# Patient Record
Sex: FEMALE | Race: WHITE | NOT HISPANIC OR LATINO
[De-identification: names, ages, dates, MRNs, and addresses within clinical notes are randomized per-mention and may not be internally consistent; named-entity substitution may affect disease eponyms.]

---

## 2022-01-07 ENCOUNTER — APPOINTMENT (OUTPATIENT)
Dept: ENDOCRINOLOGY | Facility: CLINIC | Age: 37
End: 2022-01-07

## 2022-01-28 ENCOUNTER — APPOINTMENT (OUTPATIENT)
Dept: ENDOCRINOLOGY | Facility: CLINIC | Age: 37
End: 2022-01-28
Payer: COMMERCIAL

## 2022-01-28 ENCOUNTER — NON-APPOINTMENT (OUTPATIENT)
Age: 37
End: 2022-01-28

## 2022-01-28 VITALS
SYSTOLIC BLOOD PRESSURE: 113 MMHG | OXYGEN SATURATION: 99 % | HEIGHT: 67 IN | HEART RATE: 75 BPM | WEIGHT: 137 LBS | DIASTOLIC BLOOD PRESSURE: 73 MMHG | BODY MASS INDEX: 21.5 KG/M2

## 2022-01-28 DIAGNOSIS — R76.8 OTHER SPECIFIED ABNORMAL IMMUNOLOGICAL FINDINGS IN SERUM: ICD-10-CM

## 2022-01-28 DIAGNOSIS — Z82.49 FAMILY HISTORY OF ISCHEMIC HEART DISEASE AND OTHER DISEASES OF THE CIRCULATORY SYSTEM: ICD-10-CM

## 2022-01-28 DIAGNOSIS — Z80.9 FAMILY HISTORY OF MALIGNANT NEOPLASM, UNSPECIFIED: ICD-10-CM

## 2022-01-28 DIAGNOSIS — Z83.3 FAMILY HISTORY OF DIABETES MELLITUS: ICD-10-CM

## 2022-01-28 DIAGNOSIS — Z78.9 OTHER SPECIFIED HEALTH STATUS: ICD-10-CM

## 2022-01-28 DIAGNOSIS — R79.89 OTHER SPECIFIED ABNORMAL FINDINGS OF BLOOD CHEMISTRY: ICD-10-CM

## 2022-01-28 PROCEDURE — 99204 OFFICE O/P NEW MOD 45 MIN: CPT

## 2022-01-31 NOTE — PHYSICAL EXAM
[Alert] : alert [Well Nourished] : well nourished [No Acute Distress] : no acute distress [Well Developed] : well developed [Normal Sclera/Conjunctiva] : normal sclera/conjunctiva [EOMI] : extra ocular movement intact [No Proptosis] : no proptosis [No Lid Lag] : no lid lag [Normal Hearing] : hearing was normal [No LAD] : no lymphadenopathy [Supple] : the neck was supple [Thyroid Not Enlarged] : the thyroid was not enlarged [No Thyroid Nodules] : no palpable thyroid nodules [No Respiratory Distress] : no respiratory distress [No Accessory Muscle Use] : no accessory muscle use [Normal Rate and Effort] : normal respiratory rate and effort [Clear to Auscultation] : lungs were clear to auscultation bilaterally [Normal S1, S2] : normal S1 and S2 [No Murmurs] : no murmurs [Normal Rate] : heart rate was normal [Regular Rhythm] : with a regular rhythm [No Edema] : no peripheral edema [No Stigmata of Cushings Syndrome] : no stigmata of Cushings Syndrome [Normal Gait] : normal gait [No Clubbing, Cyanosis] : no clubbing  or cyanosis of the fingernails [No Involuntary Movements] : no involuntary movements were seen [No Rash] : no rash [Abdominal Striae] : no abdominal striae [Acanthosis Nigricans] : no acanthosis nigricans [Acne] : no acne [Hirsutism] : no hirsutism [Normal Reflexes] : deep tendon reflexes were 2+ and symmetric [No Tremors] : no tremors [Oriented x3] : oriented to person, place, and time

## 2022-01-31 NOTE — ASSESSMENT
[FreeTextEntry1] : 1. Patient referred for positive TPO Ab \par Reviewed previous bloodwork from outside in 2021, noted normal TSH, free t4, and free t3 slightly low. Noted borderline low free t3 is likely clinically insignificant\par Explained positive TPO AB is indicative of autoimmune thyoird/Hashimoto background\par Patient is clinically euthyroid today, not currently pregnant or looking to be pregnant in future\par Advised patient she does not require thyroid medication at this time and to routine monitor TFTs at PCP office\par \par Answered all questions today; patient verbalized understanding of the above\par RTC as needed

## 2022-01-31 NOTE — DATA REVIEWED
[FreeTextEntry1] : TPO Ab 173 (<35), total t3 64 (), vitamin D 31.6 ()\par TSH 1.25, free t4 1.1, t3 uptake 32.1% normal, free t3 normal 2.3\par LH 4.9, FSH 5.5 normal\par Testosteroen 14.1 free testo 1.73, SHBG 56

## 2022-01-31 NOTE — HISTORY OF PRESENT ILLNESS
[FreeTextEntry1] : YAYA VERONICA  is a 37 yo female  with past medical history of who presents to clinic today as referred by for management of abnormal thyroid blood tests.\par \par Patient states she was referred for abnormal blood tests and found to have thyroid antibody positive and was referred for further management. She denies any known h/o thyroid disease, never treated with thyroid medication. She denies family h/o thyroid disease or thyroid cancer. Denies childhood irradiation to head or neck regions.She also  denies heat or cold intolerance, dyspnea, dysphagia, dysphonia, changes in bowel habits including diarrhea or constipation or any recent change in weight.  She denies change in menstrual cycles (cycles are regular, last days, LMP is 1/23/22 ). She also denies anxiety, palpitations, tremors/shaking of extremities or insomnia.\par \par PMH: none\par PSH: none\par Family Hx: DM, HTN. No h/o thyroid disease or thyroid cancer\par Social Hx: denies ETOH, tobacco or illicit drug use. works as immigrant \par ALL: NKDA \par Home Meds: MVI, vitamin D3

## 2022-01-31 NOTE — REVIEW OF SYSTEMS
[Fatigue] : no fatigue [Decreased Appetite] : appetite not decreased [Recent Weight Gain (___ Lbs)] : no recent weight gain [Recent Weight Loss (___ Lbs)] : no recent weight loss [Visual Field Defect] : no visual field defect [Dysphagia] : no dysphagia [Neck Pain] : no neck pain [Hearing Loss] : no hearing loss  [Dysphonia] : no dysphonia [Nasal Congestion] : no nasal congestion [Chest Pain] : no chest pain [Slow Heart Rate] : heart rate is not slow [Leg Claudication] : no leg claudication [Palpitations] : no palpitations [Fast Heart Rate] : heart rate is not fast [Lower Ext Edema] : no lower extremity edema [Shortness Of Breath] : no shortness of breath [Cough] : no cough [Nausea] : no nausea [Constipation] : no constipation [Abdominal Pain] : no abdominal pain [Diarrhea] : no diarrhea [Polyuria] : no polyuria [Irregular Menses] : regular menses [Headaches] : no headaches [Dizziness] : no dizziness [Tremors] : no tremors [Pain/Numbness of Digits] : no pain/numbness of digits [Polydipsia] : no polydipsia [Cold Intolerance] : no cold intolerance [Heat Intolerance] : no heat intolerance [Hot Flashes] : no hot flashes

## 2022-01-31 NOTE — CONSULT LETTER
[Dear  ___] : Dear  [unfilled], [Consult Letter:] : I had the pleasure of evaluating your patient, [unfilled]. [Please see my note below.] : Please see my note below. [Consult Closing:] : Thank you very much for allowing me to participate in the care of this patient.  If you have any questions, please do not hesitate to contact me. [Sincerely,] : Sincerely, [FreeTextEntry3] : Patti Charles, DO\par Endocrinologist \par [facility]\par